# Patient Record
Sex: FEMALE | Race: OTHER | Employment: UNEMPLOYED | URBAN - METROPOLITAN AREA
[De-identification: names, ages, dates, MRNs, and addresses within clinical notes are randomized per-mention and may not be internally consistent; named-entity substitution may affect disease eponyms.]

---

## 2020-04-24 ENCOUNTER — HOSPITAL ENCOUNTER (OUTPATIENT)
Age: 30
Discharge: HOME | End: 2020-04-24
Attending: OBSTETRICS & GYNECOLOGY | Admitting: OBSTETRICS & GYNECOLOGY

## 2020-04-24 ENCOUNTER — HOSPITAL ENCOUNTER (EMERGENCY)
Age: 30
Discharge: HOME | End: 2020-04-24

## 2020-04-24 ENCOUNTER — APPOINTMENT (OUTPATIENT)
Dept: ULTRASOUND IMAGING | Age: 30
End: 2020-04-24

## 2020-04-24 VITALS
TEMPERATURE: 98.1 F | HEIGHT: 62 IN | SYSTOLIC BLOOD PRESSURE: 93 MMHG | OXYGEN SATURATION: 99 % | HEART RATE: 98 BPM | RESPIRATION RATE: 18 BRPM | DIASTOLIC BLOOD PRESSURE: 56 MMHG | WEIGHT: 150 LBS | BODY MASS INDEX: 27.6 KG/M2

## 2020-04-24 VITALS — BODY MASS INDEX: 23.36 KG/M2 | HEIGHT: 60 IN | RESPIRATION RATE: 16 BRPM | WEIGHT: 119 LBS

## 2020-04-24 LAB
A/G RATIO: 1 (ref 1.1–2.2)
ABO/RH: NORMAL
ALBUMIN SERPL-MCNC: 3.5 G/DL (ref 3.4–5)
ALP BLD-CCNC: 121 U/L (ref 40–129)
ALT SERPL-CCNC: 10 U/L (ref 10–40)
ANION GAP SERPL CALCULATED.3IONS-SCNC: 14 MMOL/L (ref 3–16)
AST SERPL-CCNC: 20 U/L (ref 15–37)
BACTERIA WET PREP: NORMAL
BACTERIA: ABNORMAL /HPF
BASOPHILS ABSOLUTE: 0 K/UL (ref 0–0.2)
BASOPHILS RELATIVE PERCENT: 0.3 %
BILIRUB SERPL-MCNC: 0.4 MG/DL (ref 0–1)
BILIRUBIN URINE: ABNORMAL
BLOOD, URINE: NEGATIVE
BUN BLDV-MCNC: 3 MG/DL (ref 7–20)
CALCIUM SERPL-MCNC: 8.7 MG/DL (ref 8.3–10.6)
CHLORIDE BLD-SCNC: 101 MMOL/L (ref 99–110)
CLARITY: CLEAR
CLUE CELLS: NORMAL
CO2: 21 MMOL/L (ref 21–32)
COLOR: ABNORMAL
CREAT SERPL-MCNC: <0.5 MG/DL (ref 0.6–1.1)
EOSINOPHILS ABSOLUTE: 0 K/UL (ref 0–0.6)
EOSINOPHILS RELATIVE PERCENT: 0.1 %
EPITHELIAL CELLS WET PREP: NORMAL
EPITHELIAL CELLS, UA: 5 /HPF (ref 0–5)
GFR AFRICAN AMERICAN: >60
GFR NON-AFRICAN AMERICAN: >60
GLOBULIN: 3.6 G/DL
GLUCOSE BLD-MCNC: 119 MG/DL (ref 70–99)
GLUCOSE URINE: NEGATIVE MG/DL
GONADOTROPIN, CHORIONIC (HCG) QUANT: NORMAL MIU/ML
HCT VFR BLD CALC: 34.1 % (ref 36–48)
HEMOGLOBIN: 11.4 G/DL (ref 12–16)
HYALINE CASTS: 5 /LPF (ref 0–8)
KETONES, URINE: >=80 MG/DL
LEUKOCYTE ESTERASE, URINE: ABNORMAL
LIPASE: 17 U/L (ref 13–60)
LYMPHOCYTES ABSOLUTE: 0.8 K/UL (ref 1–5.1)
LYMPHOCYTES RELATIVE PERCENT: 15.2 %
MCH RBC QN AUTO: 30.1 PG (ref 26–34)
MCHC RBC AUTO-ENTMCNC: 33.6 G/DL (ref 31–36)
MCV RBC AUTO: 89.5 FL (ref 80–100)
MICROSCOPIC EXAMINATION: YES
MONOCYTES ABSOLUTE: 0.3 K/UL (ref 0–1.3)
MONOCYTES RELATIVE PERCENT: 5.7 %
NEUTROPHILS ABSOLUTE: 4.1 K/UL (ref 1.7–7.7)
NEUTROPHILS RELATIVE PERCENT: 78.7 %
NITRITE, URINE: NEGATIVE
PDW BLD-RTO: 12.8 % (ref 12.4–15.4)
PH UA: 6 (ref 5–8)
PLATELET # BLD: 247 K/UL (ref 135–450)
PMV BLD AUTO: 7.5 FL (ref 5–10.5)
POTASSIUM SERPL-SCNC: 4.1 MMOL/L (ref 3.5–5.1)
PROTEIN UA: NEGATIVE MG/DL
RBC # BLD: 3.81 M/UL (ref 4–5.2)
RBC UA: 2 /HPF (ref 0–4)
RBC WET PREP: NORMAL
SODIUM BLD-SCNC: 136 MMOL/L (ref 136–145)
SOURCE WET PREP: NORMAL
SPECIFIC GRAVITY UA: 1.02 (ref 1–1.03)
TOTAL PROTEIN: 7.1 G/DL (ref 6.4–8.2)
TRICHOMONAS PREP: NORMAL
URINE REFLEX TO CULTURE: YES
URINE TYPE: ABNORMAL
UROBILINOGEN, URINE: 1 E.U./DL
WBC # BLD: 5.2 K/UL (ref 4–11)
WBC UA: 6 /HPF (ref 0–5)
WBC WET PREP: NORMAL
YEAST WET PREP: NORMAL

## 2020-04-24 PROCEDURE — 87086 URINE CULTURE/COLONY COUNT: CPT

## 2020-04-24 PROCEDURE — 99211 OFF/OP EST MAY X REQ PHY/QHP: CPT

## 2020-04-24 PROCEDURE — 76815 OB US LIMITED FETUS(S): CPT

## 2020-04-24 PROCEDURE — 87491 CHLMYD TRACH DNA AMP PROBE: CPT

## 2020-04-24 PROCEDURE — 83690 ASSAY OF LIPASE: CPT

## 2020-04-24 PROCEDURE — 6370000000 HC RX 637 (ALT 250 FOR IP): Performed by: PHYSICIAN ASSISTANT

## 2020-04-24 PROCEDURE — 36415 COLL VENOUS BLD VENIPUNCTURE: CPT

## 2020-04-24 PROCEDURE — 85025 COMPLETE CBC W/AUTO DIFF WBC: CPT

## 2020-04-24 PROCEDURE — 86901 BLOOD TYPING SEROLOGIC RH(D): CPT

## 2020-04-24 PROCEDURE — 81001 URINALYSIS AUTO W/SCOPE: CPT

## 2020-04-24 PROCEDURE — 99284 EMERGENCY DEPT VISIT MOD MDM: CPT

## 2020-04-24 PROCEDURE — 87210 SMEAR WET MOUNT SALINE/INK: CPT

## 2020-04-24 PROCEDURE — 84702 CHORIONIC GONADOTROPIN TEST: CPT

## 2020-04-24 PROCEDURE — 86900 BLOOD TYPING SEROLOGIC ABO: CPT

## 2020-04-24 PROCEDURE — 87591 N.GONORRHOEAE DNA AMP PROB: CPT

## 2020-04-24 PROCEDURE — 80053 COMPREHEN METABOLIC PANEL: CPT

## 2020-04-24 RX ORDER — PNV NO.95/FERROUS FUM/FOLIC AC 28MG-0.8MG
1 TABLET ORAL DAILY
Qty: 30 TABLET | Refills: 0 | Status: ON HOLD | OUTPATIENT
Start: 2020-04-24 | End: 2020-06-28

## 2020-04-24 RX ORDER — CEPHALEXIN 500 MG/1
500 CAPSULE ORAL 2 TIMES DAILY
Qty: 10 CAPSULE | Refills: 0 | Status: ON HOLD | OUTPATIENT
Start: 2020-04-24 | End: 2020-06-30 | Stop reason: HOSPADM

## 2020-04-24 RX ORDER — ACETAMINOPHEN 500 MG
1000 TABLET ORAL ONCE
Status: COMPLETED | OUTPATIENT
Start: 2020-04-24 | End: 2020-04-24

## 2020-04-24 RX ADMIN — ACETAMINOPHEN 1000 MG: 500 TABLET, FILM COATED ORAL at 14:44

## 2020-04-24 ASSESSMENT — PAIN SCALES - GENERAL
PAINLEVEL_OUTOF10: 10
PAINLEVEL_OUTOF10: 9

## 2020-04-24 ASSESSMENT — PAIN DESCRIPTION - LOCATION: LOCATION: ABDOMEN

## 2020-04-24 ASSESSMENT — PAIN DESCRIPTION - PAIN TYPE: TYPE: ACUTE PAIN

## 2020-04-24 NOTE — ED PROVIDER NOTES
History reviewed. No pertinent past medical history. SURGICAL HISTORY   History reviewed. No pertinent surgical history. [address]       Discharge Medication List as of 4/24/2020  4:18 PM            ALLERGIES     Patient has no known allergies. FAMILYHISTORY     History reviewed. No pertinent family history. SOCIAL HISTORY       Social History     Tobacco Use    Smoking status: Never Smoker    Smokeless tobacco: Never Used   Substance Use Topics    Alcohol use: Not on file    Drug use: Not on file       SCREENINGS             PHYSICAL EXAM    (up to 7 for level 4, 8 or more for level 5)     ED Triage Vitals [04/24/20 1230]   BP Temp Temp Source Pulse Resp SpO2 Height Weight   99/65 98.1 °F (36.7 °C) Oral 115 16 97 % 5' 2\" (1.575 m) 150 lb (68 kg)       Physical Exam  Vitals signs and nursing note reviewed. Constitutional:       Appearance: She is well-developed. She is not diaphoretic. HENT:      Head: Atraumatic. Nose: Nose normal.   Eyes:      General:         Right eye: No discharge. Left eye: No discharge. Neck:      Musculoskeletal: Normal range of motion. Cardiovascular:      Rate and Rhythm: Normal rate and regular rhythm. Heart sounds: No murmur. No friction rub. No gallop. Pulmonary:      Effort: Pulmonary effort is normal. No respiratory distress. Breath sounds: No stridor. No wheezing, rhonchi or rales. Abdominal:      General: Bowel sounds are normal. There is no distension. Palpations: Abdomen is soft. There is no mass. Tenderness: There is no abdominal tenderness. There is no guarding or rebound. Hernia: No hernia is present. Musculoskeletal: Normal range of motion. General: No swelling. Skin:     General: Skin is warm and dry. Findings: No erythema or rash. Neurological:      Mental Status: She is alert and oriented to person, place, and time. Cranial Nerves: No cranial nerve deficit. Psychiatric:         Behavior: Behavior normal.         DIAGNOSTIC RESULTS   LABS:    Labs Reviewed   CBC WITH AUTO DIFFERENTIAL - Abnormal; Notable for the following components:       Result Value    RBC 3.81 (*)     Hemoglobin 11.4 (*)     Hematocrit 34.1 (*)     Lymphocytes Absolute 0.8 (*)     All other components within normal limits    Narrative:     Performed at:  [facility] [address]  [place]   Phone [phone]   URINE RT REFLEX TO CULTURE - Abnormal; Notable for the following components:    Bilirubin Urine SMALL (*)     Ketones, Urine >=80 (*)     Leukocyte Esterase, Urine SMALL (*)     All other components within normal limits    Narrative:     Performed at:  [facility] [address]  [place]   Phone [phone]   COMPREHENSIVE METABOLIC PANEL - Abnormal; Notable for the following components:    Glucose 119 (*)     BUN 3 (*)     CREATININE <0.5 (*)     Albumin/Globulin Ratio 1.0 (*)     All other components within normal limits    Narrative:     Performed at:  [facility] [address]  [place]   Phone [phone]   MICROSCOPIC URINALYSIS - Abnormal; Notable for the following components:    Bacteria, UA RARE (*)     WBC, UA 6 (*)     All other components within normal limits    Narrative:     Performed at:  [facility] [address]  [place]   Phone [phone]   WET PREP, GENITAL    Narrative:     Performed at:  [facility] [address],  [place]   Phone [phone]   CULTURE, URINE   C.TRACHOMATIS N.GONORRHOEAE DNA   HCG, QUANTITATIVE, PREGNANCY    Narrative:     Performed at:  [facility] [address]  [place]   Phone [phone]   LIPASE    Narrative:     Performed at:  [facility]) -

## 2020-04-24 NOTE — ED NOTES
Reviewed discharge instructions with patient and patient significant other. No questions at this time. No sign of distress. AOx3. Pt taken to [facility] triage via wheelchair by this RN. 20g PIV, placed by ER RN, intact and patent at time of transport.       Sheldon Clement RN  04/24/20 [address] Perales RN  04/24/20 1956

## 2020-04-24 NOTE — FLOWSHEET NOTE
Reviewed discharge instructions with patient and patient verbalized understanding. Discharged home with partner, ambulatory and undelivered.

## 2020-04-25 LAB — URINE CULTURE, ROUTINE: NORMAL

## 2020-04-27 LAB
C TRACH DNA GENITAL QL NAA+PROBE: NEGATIVE
N. GONORRHOEAE DNA: NEGATIVE

## 2020-05-12 ENCOUNTER — INITIAL PRENATAL (OUTPATIENT)
Dept: OBGYN | Age: 30
End: 2020-05-12

## 2020-05-12 VITALS
HEART RATE: 71 BPM | BODY MASS INDEX: 23.63 KG/M2 | WEIGHT: 121 LBS | SYSTOLIC BLOOD PRESSURE: 95 MMHG | DIASTOLIC BLOOD PRESSURE: 51 MMHG

## 2020-05-12 LAB
ABO/RH: NORMAL
ANTIBODY SCREEN: NORMAL
BILIRUBIN URINE: NEGATIVE MG/DL
BLOOD, URINE: NEGATIVE
CLARITY: CLEAR
COLOR: YELLOW
GLUCOSE CHALLENGE: 117 MG/DL
GLUCOSE URINE: NEGATIVE MG/DL
HEPATITIS C ANTIBODY INTERPRETATION: NORMAL
KETONES, URINE: NEGATIVE MG/DL
LEUKOCYTE ESTERASE, URINE: NEGATIVE
NITRITE, URINE: NEGATIVE
PH UA: 7 (ref 5–8)
PROTEIN UA: NEGATIVE MG/DL
SPECIFIC GRAVITY UA: 1.01 (ref 1–1.03)
UROBILINOGEN, URINE: 0.2 E.U./DL

## 2020-05-12 PROCEDURE — 86702 HIV-2 ANTIBODY: CPT

## 2020-05-12 PROCEDURE — 87340 HEPATITIS B SURFACE AG IA: CPT

## 2020-05-12 PROCEDURE — 88175 CYTOPATH C/V AUTO FLUID REDO: CPT

## 2020-05-12 PROCEDURE — 87390 HIV-1 AG IA: CPT

## 2020-05-12 PROCEDURE — 86780 TREPONEMA PALLIDUM: CPT

## 2020-05-12 PROCEDURE — 85025 COMPLETE CBC W/AUTO DIFF WBC: CPT

## 2020-05-12 PROCEDURE — 86701 HIV-1ANTIBODY: CPT

## 2020-05-12 PROCEDURE — 86900 BLOOD TYPING SEROLOGIC ABO: CPT

## 2020-05-12 PROCEDURE — 82950 GLUCOSE TEST: CPT

## 2020-05-12 PROCEDURE — 87086 URINE CULTURE/COLONY COUNT: CPT

## 2020-05-12 PROCEDURE — 86901 BLOOD TYPING SEROLOGIC RH(D): CPT

## 2020-05-12 PROCEDURE — 86850 RBC ANTIBODY SCREEN: CPT

## 2020-05-12 PROCEDURE — 86592 SYPHILIS TEST NON-TREP QUAL: CPT

## 2020-05-12 PROCEDURE — 99204 OFFICE O/P NEW MOD 45 MIN: CPT | Performed by: OBSTETRICS & GYNECOLOGY

## 2020-05-12 PROCEDURE — 86762 RUBELLA ANTIBODY: CPT

## 2020-05-12 PROCEDURE — 81003 URINALYSIS AUTO W/O SCOPE: CPT

## 2020-05-12 PROCEDURE — 86803 HEPATITIS C AB TEST: CPT

## 2020-05-12 RX ORDER — VITAMIN A, VITAMIN C, VITAMIN D-3, VITAMIN E, VITAMIN B-1, VITAMIN B-2, NIACIN, VITAMIN B-6, CALCIUM, IRON, ZINC, COPPER 4000; 120; 400; 22; 1.84; 3; 20; 10; 1; 12; 200; 27; 25; 2 [IU]/1; MG/1; [IU]/1; MG/1; MG/1; MG/1; MG/1; MG/1; MG/1; UG/1; MG/1; MG/1; MG/1; MG/1
1 TABLET ORAL DAILY
Qty: 30 TABLET | Refills: 11 | Status: ON HOLD | OUTPATIENT
Start: 2020-05-12 | End: 2020-06-30 | Stop reason: HOSPADM

## 2020-05-12 NOTE — PROGRESS NOTES
Initial prenatal visit late to care, states she was seen in ER/triage 4/24/20 US report on chart. Denies discomfort , contractions bleeding or leaking. + fetal movement kick counts education given verbalized understanding .  No cats in home states she had chickenpox as child, no falls and no travel outside of [place] in past 3 months, no exposure to anyone with covid -19

## 2020-05-13 LAB
BASOPHILS ABSOLUTE: 0 K/UL (ref 0–0.2)
BASOPHILS RELATIVE PERCENT: 0.6 %
EOSINOPHILS ABSOLUTE: 0.1 K/UL (ref 0–0.6)
EOSINOPHILS RELATIVE PERCENT: 1.7 %
HCT VFR BLD CALC: 32.1 % (ref 36–48)
HEMOGLOBIN: 10.6 G/DL (ref 12–16)
HEPATITIS B SURFACE ANTIGEN INTERPRETATION: ABNORMAL
HIV AG/AB: NORMAL
HIV ANTIGEN: NORMAL
HIV-1 ANTIBODY: NORMAL
HIV-2 AB: NORMAL
LYMPHOCYTES ABSOLUTE: 0.9 K/UL (ref 1–5.1)
LYMPHOCYTES RELATIVE PERCENT: 18.4 %
MCH RBC QN AUTO: 29.6 PG (ref 26–34)
MCHC RBC AUTO-ENTMCNC: 33.1 G/DL (ref 31–36)
MCV RBC AUTO: 89.3 FL (ref 80–100)
MONOCYTES ABSOLUTE: 0.4 K/UL (ref 0–1.3)
MONOCYTES RELATIVE PERCENT: 8.6 %
NEUTROPHILS ABSOLUTE: 3.6 K/UL (ref 1.7–7.7)
NEUTROPHILS RELATIVE PERCENT: 70.7 %
PDW BLD-RTO: 13.4 % (ref 12.4–15.4)
PLATELET # BLD: 313 K/UL (ref 135–450)
PMV BLD AUTO: 8.2 FL (ref 5–10.5)
RBC # BLD: 3.6 M/UL (ref 4–5.2)
RUBELLA ANTIBODY IGG: 89 IU/ML
TOTAL SYPHILLIS IGG/IGM: ABNORMAL
URINE CULTURE, ROUTINE: NORMAL
WBC # BLD: 5 K/UL (ref 4–11)

## 2020-05-22 ENCOUNTER — HOSPITAL ENCOUNTER (OUTPATIENT)
Dept: ULTRASOUND IMAGING | Age: 30
Discharge: HOME | End: 2020-05-22

## 2020-05-22 PROCEDURE — 76805 OB US >/= 14 WKS SNGL FETUS: CPT

## 2020-05-24 PROBLEM — Z78.9 LANGUAGE BARRIER: Status: ACTIVE | Noted: 2020-05-24

## 2020-05-24 PROBLEM — O09.33 LATE PRENATAL CARE AFFECTING PREGNANCY IN THIRD TRIMESTER: Status: ACTIVE | Noted: 2020-05-24

## 2020-05-26 ENCOUNTER — ROUTINE PRENATAL (OUTPATIENT)
Dept: OBGYN | Age: 30
End: 2020-05-26

## 2020-05-26 VITALS
BODY MASS INDEX: 24.22 KG/M2 | WEIGHT: 124 LBS | DIASTOLIC BLOOD PRESSURE: 59 MMHG | SYSTOLIC BLOOD PRESSURE: 93 MMHG | HEART RATE: 80 BPM

## 2020-05-26 LAB
BILIRUBIN URINE: NEGATIVE MG/DL
BLOOD, URINE: NEGATIVE
CLARITY: CLEAR
COLOR: YELLOW
GLUCOSE URINE: NEGATIVE MG/DL
KETONES, URINE: NEGATIVE MG/DL
LEUKOCYTE ESTERASE, URINE: ABNORMAL
NITRITE, URINE: NEGATIVE
PH UA: 7.5 (ref 5–8)
PROTEIN UA: 100 MG/DL
SPECIFIC GRAVITY UA: 1.01 (ref 1–1.03)
UROBILINOGEN, URINE: 0.2 E.U./DL

## 2020-05-26 PROCEDURE — 90471 IMMUNIZATION ADMIN: CPT | Performed by: OBSTETRICS & GYNECOLOGY

## 2020-05-26 PROCEDURE — 6360000002 HC RX W HCPCS: Performed by: OBSTETRICS & GYNECOLOGY

## 2020-05-26 PROCEDURE — 99212 OFFICE O/P EST SF 10 MIN: CPT | Performed by: OBSTETRICS & GYNECOLOGY

## 2020-05-26 PROCEDURE — 90715 TDAP VACCINE 7 YRS/> IM: CPT | Performed by: OBSTETRICS & GYNECOLOGY

## 2020-05-26 PROCEDURE — 81003 URINALYSIS AUTO W/O SCOPE: CPT

## 2020-05-26 RX ADMIN — TETANUS TOXOID, REDUCED DIPHTHERIA TOXOID AND ACELLULAR PERTUSSIS VACCINE, ADSORBED 0.5 ML: 5; 2.5; 8; 8; 2.5 SUSPENSION INTRAMUSCULAR at 15:14

## 2020-05-26 NOTE — PROGRESS NOTES
No complaints. Taking PNV> Labs reviewed and WNL. Received Tdap. 2+ prot in urine. No edema .  Contaminant

## 2020-06-16 ENCOUNTER — ROUTINE PRENATAL (OUTPATIENT)
Dept: OBGYN | Age: 30
End: 2020-06-16

## 2020-06-16 ENCOUNTER — HOSPITAL ENCOUNTER (OUTPATIENT)
Age: 30
Discharge: HOME | End: 2020-06-16

## 2020-06-16 VITALS
HEART RATE: 75 BPM | SYSTOLIC BLOOD PRESSURE: 93 MMHG | WEIGHT: 129 LBS | BODY MASS INDEX: 25.19 KG/M2 | DIASTOLIC BLOOD PRESSURE: 61 MMHG

## 2020-06-16 LAB
BILIRUBIN URINE: NEGATIVE MG/DL
BLOOD, URINE: NEGATIVE
CLARITY: CLEAR
COLOR: YELLOW
GLUCOSE URINE: NEGATIVE MG/DL
HCT VFR BLD CALC: 35.6 % (ref 36–48)
HEMOGLOBIN: 11.6 G/DL (ref 12–16)
KETONES, URINE: NEGATIVE MG/DL
LEUKOCYTE ESTERASE, URINE: NEGATIVE
MCH RBC QN AUTO: 29 PG (ref 26–34)
MCHC RBC AUTO-ENTMCNC: 32.6 G/DL (ref 31–36)
MCV RBC AUTO: 88.9 FL (ref 80–100)
NITRITE, URINE: NEGATIVE
PDW BLD-RTO: 15.2 % (ref 12.4–15.4)
PH UA: 7 (ref 5–8)
PLATELET # BLD: 266 K/UL (ref 135–450)
PMV BLD AUTO: 8.7 FL (ref 5–10.5)
PROTEIN UA: NEGATIVE MG/DL
RBC # BLD: 4.01 M/UL (ref 4–5.2)
SPECIFIC GRAVITY UA: 1.01 (ref 1–1.03)
UROBILINOGEN, URINE: 0.2 E.U./DL
WBC # BLD: 5.8 K/UL (ref 4–11)

## 2020-06-16 PROCEDURE — 81003 URINALYSIS AUTO W/O SCOPE: CPT

## 2020-06-16 PROCEDURE — 99212 OFFICE O/P EST SF 10 MIN: CPT | Performed by: OBSTETRICS & GYNECOLOGY

## 2020-06-16 PROCEDURE — 87081 CULTURE SCREEN ONLY: CPT

## 2020-06-16 PROCEDURE — 36415 COLL VENOUS BLD VENIPUNCTURE: CPT

## 2020-06-16 PROCEDURE — 85027 COMPLETE CBC AUTOMATED: CPT

## 2020-06-16 NOTE — PROGRESS NOTES
Patient denies vaginal bleeding and leaking of fluid. Patient states she has occas ctx. GBS information sheet given and explained. Pt states understanding of testing and denies further questions.

## 2020-06-20 LAB — GROUP B STREP CULTURE: NORMAL

## 2020-06-23 ENCOUNTER — ROUTINE PRENATAL (OUTPATIENT)
Dept: OBGYN | Age: 30
End: 2020-06-23

## 2020-06-23 LAB
BILIRUBIN URINE: NEGATIVE MG/DL
BLOOD, URINE: NEGATIVE
CLARITY: CLEAR
COLOR: YELLOW
GLUCOSE URINE: NEGATIVE MG/DL
KETONES, URINE: NEGATIVE MG/DL
LEUKOCYTE ESTERASE, URINE: ABNORMAL
NITRITE, URINE: NEGATIVE
PH UA: 7.5 (ref 5–8)
PROTEIN UA: ABNORMAL MG/DL
SPECIFIC GRAVITY UA: 1.02 (ref 1–1.03)
UROBILINOGEN, URINE: 0.2 E.U./DL

## 2020-06-23 PROCEDURE — 99212 OFFICE O/P EST SF 10 MIN: CPT | Performed by: OBSTETRICS & GYNECOLOGY

## 2020-06-23 PROCEDURE — 81003 URINALYSIS AUTO W/O SCOPE: CPT

## 2020-06-28 ENCOUNTER — HOSPITAL ENCOUNTER (INPATIENT)
Age: 30
LOS: 2 days | Discharge: HOME | DRG: 560 | End: 2020-06-30
Attending: OBSTETRICS & GYNECOLOGY | Admitting: OBSTETRICS & GYNECOLOGY
Payer: MEDICAID

## 2020-06-28 ENCOUNTER — ANESTHESIA EVENT (OUTPATIENT)
Dept: LABOR AND DELIVERY | Age: 30
End: 2020-06-28

## 2020-06-28 ENCOUNTER — ANESTHESIA (OUTPATIENT)
Dept: LABOR AND DELIVERY | Age: 30
End: 2020-06-28

## 2020-06-28 PROBLEM — Z37.9 NORMAL LABOR: Status: ACTIVE | Noted: 2020-06-28

## 2020-06-28 LAB
ABO/RH: NORMAL
AMPHETAMINE SCREEN, URINE: NORMAL
ANTIBODY SCREEN: NORMAL
BARBITURATE SCREEN URINE: NORMAL
BASOPHILS ABSOLUTE: 0 K/UL (ref 0–0.2)
BASOPHILS RELATIVE PERCENT: 0.4 %
BENZODIAZEPINE SCREEN, URINE: NORMAL
BUPRENORPHINE URINE: NORMAL
CANNABINOID SCREEN URINE: NORMAL
COCAINE METABOLITE SCREEN URINE: NORMAL
EOSINOPHILS ABSOLUTE: 0 K/UL (ref 0–0.6)
EOSINOPHILS RELATIVE PERCENT: 0.4 %
HCT VFR BLD CALC: 35.7 % (ref 36–48)
HEMOGLOBIN: 12 G/DL (ref 12–16)
LYMPHOCYTES ABSOLUTE: 1.3 K/UL (ref 1–5.1)
LYMPHOCYTES RELATIVE PERCENT: 14.6 %
Lab: NORMAL
MCH RBC QN AUTO: 29.4 PG (ref 26–34)
MCHC RBC AUTO-ENTMCNC: 33.7 G/DL (ref 31–36)
MCV RBC AUTO: 87 FL (ref 80–100)
METHADONE SCREEN, URINE: NORMAL
MONOCYTES ABSOLUTE: 0.5 K/UL (ref 0–1.3)
MONOCYTES RELATIVE PERCENT: 6.3 %
NEUTROPHILS ABSOLUTE: 6.8 K/UL (ref 1.7–7.7)
NEUTROPHILS RELATIVE PERCENT: 78.3 %
OPIATE SCREEN URINE: NORMAL
OXYCODONE URINE: NORMAL
PDW BLD-RTO: 15.2 % (ref 12.4–15.4)
PH UA: 6
PHENCYCLIDINE SCREEN URINE: NORMAL
PLATELET # BLD: 281 K/UL (ref 135–450)
PMV BLD AUTO: 8.2 FL (ref 5–10.5)
PROPOXYPHENE SCREEN: NORMAL
RBC # BLD: 4.1 M/UL (ref 4–5.2)
SARS-COV-2, NAAT: NOT DETECTED
WBC # BLD: 8.7 K/UL (ref 4–11)

## 2020-06-28 PROCEDURE — U0002 COVID-19 LAB TEST NON-CDC: HCPCS

## 2020-06-28 PROCEDURE — 86901 BLOOD TYPING SEROLOGIC RH(D): CPT

## 2020-06-28 PROCEDURE — 2580000003 HC RX 258: Performed by: OBSTETRICS & GYNECOLOGY

## 2020-06-28 PROCEDURE — 86850 RBC ANTIBODY SCREEN: CPT

## 2020-06-28 PROCEDURE — 80307 DRUG TEST PRSMV CHEM ANLYZR: CPT

## 2020-06-28 PROCEDURE — 85025 COMPLETE CBC W/AUTO DIFF WBC: CPT

## 2020-06-28 PROCEDURE — 0HQ9XZZ REPAIR PERINEUM SKIN, EXTERNAL APPROACH: ICD-10-PCS | Performed by: OBSTETRICS & GYNECOLOGY

## 2020-06-28 PROCEDURE — 6370000000 HC RX 637 (ALT 250 FOR IP): Performed by: OBSTETRICS & GYNECOLOGY

## 2020-06-28 PROCEDURE — 6360000002 HC RX W HCPCS

## 2020-06-28 PROCEDURE — 86900 BLOOD TYPING SEROLOGIC ABO: CPT

## 2020-06-28 PROCEDURE — 2500000003 HC RX 250 WO HCPCS

## 2020-06-28 PROCEDURE — 86780 TREPONEMA PALLIDUM: CPT

## 2020-06-28 PROCEDURE — 1220000000 HC SEMI PRIVATE OB R&B

## 2020-06-28 PROCEDURE — 7200000001 HC VAGINAL DELIVERY

## 2020-06-28 PROCEDURE — 6360000002 HC RX W HCPCS: Performed by: OBSTETRICS & GYNECOLOGY

## 2020-06-28 RX ORDER — ONDANSETRON 2 MG/ML
4 INJECTION INTRAMUSCULAR; INTRAVENOUS EVERY 6 HOURS PRN
Status: DISCONTINUED | OUTPATIENT
Start: 2020-06-28 | End: 2020-06-28

## 2020-06-28 RX ORDER — DOCUSATE SODIUM 100 MG/1
100 CAPSULE, LIQUID FILLED ORAL 2 TIMES DAILY
Status: DISCONTINUED | OUTPATIENT
Start: 2020-06-28 | End: 2020-06-30 | Stop reason: HOSPADM

## 2020-06-28 RX ORDER — CARBOPROST TROMETHAMINE 250 UG/ML
250 INJECTION, SOLUTION INTRAMUSCULAR PRN
Status: DISCONTINUED | OUTPATIENT
Start: 2020-06-28 | End: 2020-06-28

## 2020-06-28 RX ORDER — HYDROCODONE BITARTRATE AND ACETAMINOPHEN 5; 325 MG/1; MG/1
2 TABLET ORAL EVERY 4 HOURS PRN
Status: DISCONTINUED | OUTPATIENT
Start: 2020-06-28 | End: 2020-06-30 | Stop reason: HOSPADM

## 2020-06-28 RX ORDER — ACETAMINOPHEN 325 MG/1
650 TABLET ORAL EVERY 4 HOURS PRN
Status: DISCONTINUED | OUTPATIENT
Start: 2020-06-28 | End: 2020-06-28

## 2020-06-28 RX ORDER — SODIUM CHLORIDE 0.9 % (FLUSH) 0.9 %
10 SYRINGE (ML) INJECTION EVERY 12 HOURS SCHEDULED
Status: DISCONTINUED | OUTPATIENT
Start: 2020-06-28 | End: 2020-06-28

## 2020-06-28 RX ORDER — SODIUM CHLORIDE, SODIUM LACTATE, POTASSIUM CHLORIDE, CALCIUM CHLORIDE 600; 310; 30; 20 MG/100ML; MG/100ML; MG/100ML; MG/100ML
INJECTION, SOLUTION INTRAVENOUS CONTINUOUS
Status: DISCONTINUED | OUTPATIENT
Start: 2020-06-28 | End: 2020-06-28

## 2020-06-28 RX ORDER — SODIUM CHLORIDE 0.9 % (FLUSH) 0.9 %
10 SYRINGE (ML) INJECTION PRN
Status: DISCONTINUED | OUTPATIENT
Start: 2020-06-28 | End: 2020-06-28

## 2020-06-28 RX ORDER — HYDROCODONE BITARTRATE AND ACETAMINOPHEN 5; 325 MG/1; MG/1
1 TABLET ORAL EVERY 4 HOURS PRN
Status: DISCONTINUED | OUTPATIENT
Start: 2020-06-28 | End: 2020-06-30 | Stop reason: HOSPADM

## 2020-06-28 RX ORDER — LIDOCAINE HYDROCHLORIDE 10 MG/ML
INJECTION, SOLUTION EPIDURAL; INFILTRATION; INTRACAUDAL; PERINEURAL
Status: DISCONTINUED
Start: 2020-06-28 | End: 2020-06-28

## 2020-06-28 RX ORDER — MODIFIED LANOLIN
OINTMENT (GRAM) TOPICAL PRN
Status: DISCONTINUED | OUTPATIENT
Start: 2020-06-28 | End: 2020-06-30 | Stop reason: HOSPADM

## 2020-06-28 RX ORDER — SODIUM CHLORIDE 0.9 % (FLUSH) 0.9 %
10 SYRINGE (ML) INJECTION EVERY 12 HOURS SCHEDULED
Status: DISCONTINUED | OUTPATIENT
Start: 2020-06-28 | End: 2020-06-30 | Stop reason: HOSPADM

## 2020-06-28 RX ORDER — IBUPROFEN 800 MG/1
800 TABLET ORAL EVERY 8 HOURS
Status: DISCONTINUED | OUTPATIENT
Start: 2020-06-28 | End: 2020-06-30 | Stop reason: HOSPADM

## 2020-06-28 RX ORDER — OXYCODONE HYDROCHLORIDE 5 MG/1
5 TABLET ORAL EVERY 6 HOURS PRN
Qty: 10 TABLET | Refills: 0 | Status: SHIPPED | OUTPATIENT
Start: 2020-06-28 | End: 2020-07-05

## 2020-06-28 RX ORDER — METHYLERGONOVINE MALEATE 0.2 MG/ML
200 INJECTION INTRAVENOUS PRN
Status: DISCONTINUED | OUTPATIENT
Start: 2020-06-28 | End: 2020-06-28

## 2020-06-28 RX ORDER — ACETAMINOPHEN 325 MG/1
650 TABLET ORAL EVERY 4 HOURS PRN
Status: DISCONTINUED | OUTPATIENT
Start: 2020-06-28 | End: 2020-06-30 | Stop reason: HOSPADM

## 2020-06-28 RX ORDER — SODIUM CHLORIDE 0.9 % (FLUSH) 0.9 %
10 SYRINGE (ML) INJECTION PRN
Status: DISCONTINUED | OUTPATIENT
Start: 2020-06-28 | End: 2020-06-30 | Stop reason: HOSPADM

## 2020-06-28 RX ORDER — DOCUSATE SODIUM 100 MG/1
100 CAPSULE, LIQUID FILLED ORAL 2 TIMES DAILY
Status: DISCONTINUED | OUTPATIENT
Start: 2020-06-28 | End: 2020-06-28

## 2020-06-28 RX ORDER — IBUPROFEN 600 MG/1
600 TABLET ORAL EVERY 6 HOURS PRN
Qty: 30 TABLET | Refills: 1 | Status: SHIPPED | OUTPATIENT
Start: 2020-06-28

## 2020-06-28 RX ADMIN — SODIUM CHLORIDE, POTASSIUM CHLORIDE, SODIUM LACTATE AND CALCIUM CHLORIDE: 600; 310; 30; 20 INJECTION, SOLUTION INTRAVENOUS at 16:35

## 2020-06-28 RX ADMIN — DOCUSATE SODIUM 100 MG: 100 CAPSULE ORAL at 20:40

## 2020-06-28 RX ADMIN — IBUPROFEN 800 MG: 800 TABLET, FILM COATED ORAL at 20:40

## 2020-06-28 RX ADMIN — SODIUM CHLORIDE, POTASSIUM CHLORIDE, SODIUM LACTATE AND CALCIUM CHLORIDE: 600; 310; 30; 20 INJECTION, SOLUTION INTRAVENOUS at 12:00

## 2020-06-28 RX ADMIN — SODIUM CHLORIDE, POTASSIUM CHLORIDE, SODIUM LACTATE AND CALCIUM CHLORIDE: 600; 310; 30; 20 INJECTION, SOLUTION INTRAVENOUS at 11:20

## 2020-06-28 RX ADMIN — METHYLERGONOVINE MALEATE 200 MCG: 0.2 INJECTION INTRAVENOUS at 17:44

## 2020-06-28 RX ADMIN — Medication: at 16:35

## 2020-06-28 RX ADMIN — LIDOCAINE HYDROCHLORIDE: 10 INJECTION, SOLUTION EPIDURAL; INFILTRATION; INTRACAUDAL; PERINEURAL at 16:45

## 2020-06-28 ASSESSMENT — PAIN SCALES - GENERAL: PAINLEVEL_OUTOF10: 2

## 2020-06-28 NOTE — PLAN OF CARE
Patient transferred to postpartum bed 4. Patient bleeding decreased post st cath at [address] for 600cc. Patient informed to call out to RN to ambulate to bathroom.

## 2020-06-28 NOTE — PLAN OF CARE
Problem: Pain:  Goal: Pain level will decrease  Description: Pain level will decrease  Outcome: Pain is increasing related to contractions. Denies medication, denies epidural, denies NCB interventions, prefers to stay in bed and ult. Focus breathing techniques.

## 2020-06-28 NOTE — DISCHARGE SUMMARY
Obstetrical Discharge Form    Gestational Age: 36w4d    Antepartum complications: none    Date of Delivery: 20      Type of Delivery: vaginal, spontaneous    Delivered By: Kendra Campoverde     Baby:      Information for the patient's :  Case Garza [[id number]]   APGAR One: 9    Information for the patient's :  Marlene Garza [[id number]]   APGAR Five: 9    Information for the patient's :  Marlene Garza [[id number]]   Birth Weight: 6 lb 14.4 oz (3.13 kg)      Anesthesia: Local    Intrapartum complications: None    Postpartum complications: none    Discharge Medication:    Bella Ness [address] [place] [address] Medication Instructions TZE:[id number]    Printed on:20 0607   Medication Information                      cephALEXin (KEFLEX) 500 MG capsule  Take 1 capsule by mouth 2 times daily             Prenatal Vit-Fe Fumarate-FA (PRENATAL VITAMIN PLUS LOW IRON) 27-1 MG TABS  Take 1 tablet by mouth daily                  Discharge Condition:  good    Discharge Date: 20    PLAN:  Follow up in 6 weeks for routine PP visit  All questions answered  D/C summary begun at delivery for D/C planning purposes, any delay in discharge from ordered D/C date due to  factors.

## 2020-06-28 NOTE — ANESTHESIA PRE PROCEDURE
Department of Anesthesiology  Preprocedure Note       Name:  Timi Friend   Age:  34 y.o.  :  1990                                          MRN:  [id number]         Date:  2020      Surgeon: * No surgeons listed *    Procedure: * No procedures listed *    Medications prior to admission:   Prior to Admission medications    Medication Sig Start Date End Date Taking?  Authorizing Provider   Prenatal Vit-Fe Fumarate-FA (PRENATAL VITAMIN PLUS LOW IRON) 27-1 MG TABS Take 1 tablet by mouth daily 20  Yes Ronnie TORRES MD   cephALEXin (KEFLEX) 500 MG capsule Take 1 capsule by mouth 2 times daily  Patient not taking: Reported on 2020   Yi Carrion PA-C       Current medications:    Current Facility-Administered Medications   Medication Dose Route Frequency Provider Last Rate Last Dose    lactated ringers infusion   Intravenous Continuous Gracie Armando MD        sodium chloride flush 0.9 % injection 10 mL  10 mL Intravenous 2 times per day Gracie Armando MD        sodium chloride flush 0.9 % injection 10 mL  10 mL Intravenous PRN Gracie Armando MD        acetaminophen (TYLENOL) tablet 650 mg  650 mg Oral Q4H PRN Gracie Armando MD        docusate sodium (COLACE) capsule 100 mg  100 mg Oral BID Emi Moya MD        ondansetron (ZOFRAN) injection 4 mg  4 mg Intravenous Q6H PRN Emi Moya MD        benzocaine-menthol (DERMOPLAST) 20-0.5 % spray   Topical PRN Gracie Armando MD        witch hazel-glycerin (TUCKS) pad   Topical PRN Gracie Armando MD        methylergonovine (METHERGINE) injection 200 mcg  200 mcg Intramuscular PRN Gracie Armando MD        carboprost (HEMABATE) injection 250 mcg  250 mcg Intramuscular PRN Gracie Armando MD           Allergies:  No Known Allergies    Problem List:    Patient Active Problem List   Diagnosis Code    Language barrier Z68.5    Late prenatal care affecting pregnancy in third Applicable): No results found for: PREGTESTUR, PREGSERUM, HCG, HCGQUANT     ABGs: No results found for: PHART, PO2ART, MXK5ILP, FOL9CAD, BEART, Q8MZOUZX     Type & Screen (If Applicable):  No results found for: LABABO, LABRH    Drug/Infectious Status (If Applicable):  No results found for: HIV, HEPCAB    COVID-19 Screening (If Applicable): No results found for: COVID19      Anesthesia Evaluation  Patient summary reviewed and Nursing notes reviewed  Airway: Mallampati: II  TM distance: >3 FB   Neck ROM: full  Mouth opening: > = 3 FB Dental: normal exam         Pulmonary:Negative Pulmonary ROS and normal exam                               Cardiovascular:Negative CV ROS                      Neuro/Psych:   Negative Neuro/Psych ROS              GI/Hepatic/Renal: Neg GI/Hepatic/Renal ROS            Endo/Other: Negative Endo/Other ROS                    Abdominal:           Vascular: negative vascular ROS. Anesthesia Plan      epidural     ASA 2     (Lab Results       Component                Value               Date                       WBC                      8.7                 06/28/2020                 HGB                      12.0                06/28/2020                 HCT                      35.7 (L)            06/28/2020                 MCV                      87.0                06/28/2020                 PLT                      281                 06/28/2020            Height: 5' (1.524 m), Weight: 129 lb (58.5 kg)  Patient debating natural vs epidural for labor and delivery. Discussed procedure and risks including but not limited to changes in VSS, allergic reaction, infection, intravascular injection, paresthesia, n/v, severe headache, temporary or permanent rare neurologic sequelae and failed block. All questions answered. Patient agreed to proceed )        Anesthetic plan and risks discussed with patient.     Use of blood products discussed with patient whom

## 2020-06-28 NOTE — FLOWSHEET NOTE
Covid testing - result not found per charge RN. Need to verify testing was completed in clinic, if not covid test to be completed in recovery per clinic policy. Verbal order to wait unitl after delivery for covid testing to be completed - patient NCB on hands and knees rocking and moaning. Deferred for patient comfort.      Shante Sands

## 2020-06-28 NOTE — PROGRESS NOTES
Contraction pain is moderate.  Epidural No    VS:   Vitals:    20 1411 20 1457 20 1524 20 1549   BP: (!) 121/57 110/62 (!) 75/50 (!) 141/64   Pulse: 69 63  97   Resp:  18 18 20   Temp:       TempSrc:       Weight:       Height:             FHT: Baseline: 150   Variability: moderate   Accelerations: Present   Decels: Variable: mild    Cervical Exam:   Dilation (cm): 7(after AROM )   Effacement (%): 90   Cervical Characteristics: Anterior   Station: 0   Presentation: Vertex  AROM done- clear fluid    A/P: 34 y.o.  38w2d   · Labor: progressing well     · Fetus: reassuring  · GBS: neg  · Other: desires NCB

## 2020-06-28 NOTE — H&P
Department of Obstetrics and Gynecology   Obstetrics History and Physical        CHIEF COMPLAINT:  contractions    HISTORY OF PRESENT ILLNESS:      The patient is a 34 y.o. female at 36w4d. OB History        3    Para   1    Term   1       0    AB   1    Living   1       SAB   0    TAB   0    Ectopic   0    Molar   0    Multiple   0    Live Births   1            Patient presents with a chief complaint as above and is being admitted for active phase labor    Estimated Due Date: Estimated Date of Delivery: 7/10/20    PRENATAL CARE:    Complicated by: none    PAST OB HISTORY:  OB History        3    Para   1    Term   1       0    AB   1    Living   1       SAB   0    TAB   0    Ectopic   0    Molar   0    Multiple   0    Live Births   1                Past Medical History:        Diagnosis Date     (normal spontaneous vaginal delivery)      Past Surgical History:    No past surgical history on file. Allergies:  Patient has no known allergies.     Social History:    Social History     Socioeconomic History    Marital status: Single     Spouse name: Not on file    Number of children: Not on file    Years of education: Not on file    Highest education level: Not on file   Occupational History    Not on file   Social Needs    Financial resource strain: Not on file    Food insecurity     Worry: Not on file     Inability: Not on file    Transportation needs     Medical: Not on file     Non-medical: Not on file   Tobacco Use    Smoking status: Never Smoker    Smokeless tobacco: Never Used   Substance and Sexual Activity    Alcohol use: Not Currently    Drug use: Never    Sexual activity: Yes     Partners: Male   Lifestyle    Physical activity     Days per week: Not on file     Minutes per session: Not on file    Stress: Not on file   Relationships    Social connections     Talks on phone: Not on file     Gets together: Not on file     Attends Protestant service: Not on file     Active member of club or organization: Not on file     Attends meetings of clubs or organizations: Not on file     Relationship status: Not on file    Intimate partner violence     Fear of current or ex partner: Not on file     Emotionally abused: Not on file     Physically abused: Not on file     Forced sexual activity: Not on file   Other Topics Concern    Not on file   Social History Narrative    Not on file     Family History:   No family history on file. Medications Prior to Admission:  Medications Prior to Admission: Prenatal Vit-Fe Fumarate-FA (PRENATAL VITAMIN PLUS LOW IRON) 27-1 MG TABS, Take 1 tablet by mouth daily  cephALEXin (KEFLEX) 500 MG capsule, Take 1 capsule by mouth 2 times daily (Patient not taking: Reported on 5/12/2020)  [DISCONTINUED] Prenatal Vit-Fe Fumarate-FA (PRENATAL VITAMIN) 27-0.8 MG TABS, Take 1 tablet by mouth daily    REVIEW OF SYSTEMS:        PHYSICAL EXAM:  Vitals:    06/28/20 0737 06/28/20 1142   BP:  117/74   Pulse:  78   Resp: 18 16   Temp: 97.8 °F (36.6 °C) 98.1 °F (36.7 °C)   TempSrc: Temporal Oral   Weight: 129 lb (58.5 kg) 129 lb (58.5 kg)   Height: 5' (1.524 m) 5' (1.524 m)     General appearance:  awake, alert, cooperative, no apparent distress, and appears stated age  Neurologic:  Awake, alert, oriented to name, place and time. Lungs:  No increased work of breathing, good air exchange  Abdomen:  Soft, non tender, gravid, consistent with her gestational age, EFW by Leopold's maneuver was 3200 gms   Fetal heart rate:  Reassuring.   Pelvis:  Adequate pelvis  Cervix: 3/80  Contraction frequency:      Membranes:  Intact    Labs:   CBC:   Lab Results   Component Value Date    WBC 8.7 06/28/2020    RBC 4.10 06/28/2020    HGB 12.0 06/28/2020    HCT 35.7 06/28/2020    MCV 87.0 06/28/2020    MCH 29.4 06/28/2020    MCHC 33.7 06/28/2020    RDW 15.2 06/28/2020     06/28/2020    MPV 8.2 06/28/2020       ASSESSMENT AND PLAN:    Labor: Admit, anticipate normal

## 2020-06-29 LAB — TOTAL SYPHILLIS IGG/IGM: NORMAL

## 2020-06-29 PROCEDURE — 6370000000 HC RX 637 (ALT 250 FOR IP): Performed by: OBSTETRICS & GYNECOLOGY

## 2020-06-29 PROCEDURE — 2580000003 HC RX 258: Performed by: OBSTETRICS & GYNECOLOGY

## 2020-06-29 PROCEDURE — 1220000000 HC SEMI PRIVATE OB R&B

## 2020-06-29 RX ADMIN — ACETAMINOPHEN 650 MG: 325 TABLET, FILM COATED ORAL at 00:48

## 2020-06-29 RX ADMIN — DOCUSATE SODIUM 100 MG: 100 CAPSULE ORAL at 09:44

## 2020-06-29 RX ADMIN — ACETAMINOPHEN 650 MG: 325 TABLET, FILM COATED ORAL at 16:16

## 2020-06-29 RX ADMIN — ACETAMINOPHEN 650 MG: 325 TABLET, FILM COATED ORAL at 09:44

## 2020-06-29 RX ADMIN — IBUPROFEN 800 MG: 800 TABLET, FILM COATED ORAL at 04:50

## 2020-06-29 RX ADMIN — Medication 10 ML: at 09:44

## 2020-06-29 RX ADMIN — IBUPROFEN 800 MG: 800 TABLET, FILM COATED ORAL at 12:24

## 2020-06-29 RX ADMIN — IBUPROFEN 800 MG: 800 TABLET, FILM COATED ORAL at 20:36

## 2020-06-29 ASSESSMENT — PAIN SCALES - GENERAL
PAINLEVEL_OUTOF10: 3
PAINLEVEL_OUTOF10: 2
PAINLEVEL_OUTOF10: 3
PAINLEVEL_OUTOF10: 3
PAINLEVEL_OUTOF10: 4
PAINLEVEL_OUTOF10: 3

## 2020-06-29 NOTE — FLOWSHEET NOTE
Court in room. Explained pain meds, colace, adamaris care, bleeding precautions, importance of emptying bladder frequently, ambulation, and ordering meals.  Pt and FOB verbalized understanding of all above info

## 2020-06-29 NOTE — FLOWSHEET NOTE
Dr. Lee Robertson notified of pt's B/P 82/53, heart rate 58. No orders noted at present will continue to monitor.

## 2020-06-29 NOTE — PLAN OF CARE
Problem: VAGINAL DELIVERY - RECOVERY AND POST PARTUM  Goal: Vital signs are medically acceptable  Outcome: Ongoing  Goal: Fundus firm at midline  Outcome: Ongoing  Goal: Moderate rubra without clots, no purulent discharge, no foul smelling lochia  Outcome: Ongoing  Goal: Verbalizes understanding of normal bowel function resumption  Outcome: Ongoing  Goal: Breasts are soft with nipple integrity intact  Outcome: Ongoing  Goal: Demonstrates appropriate breast feeding techniques  Outcome: Ongoing  Goal: Appropriate behavior observed  Outcome: Ongoing  Goal: Positive Mother-Baby interactions are observed  Outcome: Ongoing  Goal: Perineum intact without discharge or hematoma  Outcome: Ongoing     Problem: PAIN  Goal: Patient's pain/discomfort is manageable  Outcome: Ongoing     Problem: KNOWLEDGE DEFICIT  Goal: Patient/S.O. demonstrates understanding of disease process, treatment plan, medications, and discharge instructions.   Outcome: Ongoing

## 2020-06-29 NOTE — PROGRESS NOTES
[facility] post-partum note    Post-partum Day #1    Subjective:    Doing well  Lochia: Normal    Objective:  Vitals:    06/28/20 2000 06/29/20 0000 06/29/20 0440 06/29/20 0944   BP: (!) 87/53 (!) 83/44 (!) 82/53 (!) 90/56   Pulse: 59 58 58    Resp: 18 18 18    Temp: 100.5 °F (38.1 °C) 98.5 °F (36.9 °C) 98.6 °F (37 °C)    TempSrc: Oral Oral Oral    SpO2:   98%    Weight:       Height:           Physical Examination:  Appears well  Uterus: Firm, NT  Calves: NT    Labs:    Recent Labs     06/28/20  1110   WBC 8.7   HGB 12.0   HCT 35.7*        No results for input(s): NA, K, CL, CO2, BUN, CREATININE, CALCIUM, AST, ALT in the last 72 hours.     Invalid input(s): DOUGLAS      Current Facility-Administered Medications:     sodium chloride flush 0.9 % injection 10 mL, 10 mL, Intravenous, 2 times per day, Daniel Martinez MD, 10 mL at 06/29/20 0944    sodium chloride flush 0.9 % injection 10 mL, 10 mL, Intravenous, PRN, Daniel Martinez MD    acetaminophen (TYLENOL) tablet 650 mg, 650 mg, Oral, Q4H PRN, Daniel Martinez MD, 650 mg at 06/29/20 0944    ibuprofen (ADVIL;MOTRIN) tablet 800 mg, 800 mg, Oral, Q8H, Emi Moya MD, 800 mg at 06/29/20 1224    docusate sodium (COLACE) capsule 100 mg, 100 mg, Oral, BID, Chela Session MD Nita, 100 mg at 06/29/20 0944    Tetanus-Diphth-Acell Pertussis (BOOSTRIX) injection 0.5 mL, 0.5 mL, Intramuscular, Prior to discharge, Daniel Martinez MD    lansinoh lanolin ointment, , Topical, PRN, Chela Session MD Nita    benzocaine-menthol (DERMOPLAST) 20-0.5 % spray, , Topical, PRN, Daniel Martinez MD    HYDROcodone-acetaminophen (NORCO) 5-325 MG per tablet 1 tablet, 1 tablet, Oral, Q4H PRN **OR** HYDROcodone-acetaminophen (NORCO) 5-325 MG per tablet 2 tablet, 2 tablet, Oral, Q4H PRN, Daniel Martinez MD     Assessment/Plan:      Post Partum: advance Postpartum care

## 2020-06-29 NOTE — FLOWSHEET NOTE
Discharge Rx for motrin and oxycodone given to pt with instructions on use and potential side effects, using .  Pt verbalized understanding

## 2020-06-30 VITALS
BODY MASS INDEX: 25.32 KG/M2 | DIASTOLIC BLOOD PRESSURE: 49 MMHG | HEART RATE: 78 BPM | HEIGHT: 60 IN | RESPIRATION RATE: 18 BRPM | SYSTOLIC BLOOD PRESSURE: 90 MMHG | WEIGHT: 129 LBS | TEMPERATURE: 96.7 F | OXYGEN SATURATION: 98 %

## 2020-06-30 PROCEDURE — 6370000000 HC RX 637 (ALT 250 FOR IP): Performed by: OBSTETRICS & GYNECOLOGY

## 2020-06-30 RX ADMIN — IBUPROFEN 800 MG: 800 TABLET, FILM COATED ORAL at 04:45

## 2020-06-30 RX ADMIN — DOCUSATE SODIUM 100 MG: 100 CAPSULE ORAL at 09:58

## 2020-06-30 RX ADMIN — ACETAMINOPHEN 650 MG: 325 TABLET, FILM COATED ORAL at 09:58

## 2020-06-30 ASSESSMENT — PAIN SCALES - GENERAL
PAINLEVEL_OUTOF10: 4
PAINLEVEL_OUTOF10: 3

## 2020-06-30 NOTE — PROGRESS NOTES
[facility] post-partum note    Post-partum Day #2    Subjective:   Pain is : Mild  Lochia: thin lochia  Breastfeeding: plans to bottle feed    Objective:  Patient Vitals for the past 8 hrs:   BP Temp Temp src Pulse Resp   06/30/20 1003 (!) 90/49 96.7 °F (35.9 °C) Oral 78 18   06/30/20 0430 (!) 87/56 98 °F (36.7 °C) Oral 70 16     Abd: soft, non tender  Uterus: firm  Ext: no edema, calves non tender    Lab Results   Component Value Date    WBC 8.7 06/28/2020    HGB 12.0 06/28/2020    HCT 35.7 (L) 06/28/2020    MCV 87.0 06/28/2020     06/28/2020            Assessment/Plan:      Continue Postpartum care  Discharge today: yes  Follow up: 6 weeks

## 2020-06-30 NOTE — FLOWSHEET NOTE
Postpartum and infant care teaching completed and forms signed by patient. Copy witnessed by RN and given to patient. Patient verbalized understanding of all teaching points. Patient has prescriptions. Patient plans to follow-up with OB Provider in 6 weeks as instructed. Patient verbalizes understanding of discharge instructions and denies further questions. ID bands checked. Mother's ID band and one of baby's ID bands removed and taped to footprint sheet, signed by patient and witnessed by RN. Patient discharged in stable condition accompanied by family. Discharged in wheelchair, holding baby in arms.

## 2020-07-07 ENCOUNTER — FOLLOWUP TELEPHONE ENCOUNTER (OUTPATIENT)
Dept: OBGYN | Age: 30
End: 2020-07-07

## 2020-08-11 ENCOUNTER — POSTPARTUM VISIT (OUTPATIENT)
Dept: OBGYN | Age: 30
End: 2020-08-11

## 2020-08-11 VITALS
WEIGHT: 118.4 LBS | SYSTOLIC BLOOD PRESSURE: 103 MMHG | HEART RATE: 74 BPM | DIASTOLIC BLOOD PRESSURE: 63 MMHG | BODY MASS INDEX: 23.12 KG/M2

## 2020-08-11 PROCEDURE — 99214 OFFICE O/P EST MOD 30 MIN: CPT | Performed by: OBSTETRICS & GYNECOLOGY

## 2020-08-11 RX ORDER — PNV NO.95/FERROUS FUM/FOLIC AC 28MG-0.8MG
1 TABLET ORAL DAILY
Qty: 30 TABLET | Refills: 8 | Status: SHIPPED | OUTPATIENT
Start: 2020-08-11

## 2020-08-11 RX ORDER — ACETAMINOPHEN AND CODEINE PHOSPHATE 120; 12 MG/5ML; MG/5ML
1 SOLUTION ORAL DAILY
Qty: 28 TABLET | Refills: 11 | Status: SHIPPED | OUTPATIENT
Start: 2020-08-11

## 2020-08-11 NOTE — PROGRESS NOTES
Social Service Note:   Patient completed depression scale. Patient scored a 8 and is not showing signs of depression. Patient didn't have any other questions or needs at this time.     Carri NEVAREZW, [place]

## 2020-08-25 ENCOUNTER — POSTPARTUM VISIT (OUTPATIENT)
Dept: OBGYN | Age: 30
End: 2020-08-25

## 2020-08-25 VITALS
WEIGHT: 118 LBS | SYSTOLIC BLOOD PRESSURE: 103 MMHG | BODY MASS INDEX: 23.05 KG/M2 | DIASTOLIC BLOOD PRESSURE: 64 MMHG | HEART RATE: 70 BPM

## 2020-08-25 PROCEDURE — 99213 OFFICE O/P EST LOW 20 MIN: CPT | Performed by: OBSTETRICS & GYNECOLOGY

## 2020-08-25 NOTE — PROGRESS NOTES
Follow up from postpartum visit 8/11/20 & PPD sx given rx for Zoloft, patient states \"feeling better with medication\". Menses 2 weeks ago taking BCP. S: Pt states feeling better since started Zoloft.  No SI/HI  O: Blood pressure 103/64, pulse 70, weight 118 lb (53.5 kg), unknown if currently breastfeeding.   s       Mood - well  A/P: PPM Depression      Continue zoloft

## 2022-12-12 ENCOUNTER — APPOINTMENT (OUTPATIENT)
Dept: ULTRASOUND IMAGING | Age: 32
End: 2022-12-12

## 2022-12-12 ENCOUNTER — HOSPITAL ENCOUNTER (EMERGENCY)
Age: 32
Discharge: HOME | End: 2022-12-12

## 2022-12-12 VITALS
BODY MASS INDEX: 25.97 KG/M2 | DIASTOLIC BLOOD PRESSURE: 64 MMHG | OXYGEN SATURATION: 100 % | SYSTOLIC BLOOD PRESSURE: 114 MMHG | RESPIRATION RATE: 18 BRPM | TEMPERATURE: 98.1 F | WEIGHT: 133 LBS | HEART RATE: 77 BPM

## 2022-12-12 DIAGNOSIS — O20.0 THREATENED MISCARRIAGE: Primary | ICD-10-CM

## 2022-12-12 LAB
A/G RATIO: 1.4 (ref 1.1–2.2)
ABO/RH: NORMAL
ALBUMIN SERPL-MCNC: 3.9 G/DL (ref 3.4–5)
ALP BLD-CCNC: 82 U/L (ref 40–129)
ALT SERPL-CCNC: 13 U/L (ref 10–40)
ANION GAP SERPL CALCULATED.3IONS-SCNC: 7 MMOL/L (ref 3–16)
AST SERPL-CCNC: 12 U/L (ref 15–37)
BACTERIA WET PREP: NORMAL
BACTERIA: ABNORMAL /HPF
BASOPHILS ABSOLUTE: 0 K/UL (ref 0–0.2)
BASOPHILS RELATIVE PERCENT: 0.8 %
BILIRUB SERPL-MCNC: 0.4 MG/DL (ref 0–1)
BILIRUBIN URINE: NEGATIVE
BLOOD, URINE: ABNORMAL
BUN BLDV-MCNC: 9 MG/DL (ref 7–20)
CALCIUM SERPL-MCNC: 9.5 MG/DL (ref 8.3–10.6)
CHLORIDE BLD-SCNC: 105 MMOL/L (ref 99–110)
CLARITY: ABNORMAL
CLUE CELLS: NORMAL
CO2: 27 MMOL/L (ref 21–32)
COLOR: YELLOW
CREAT SERPL-MCNC: <0.5 MG/DL (ref 0.6–1.1)
EOSINOPHILS ABSOLUTE: 0.2 K/UL (ref 0–0.6)
EOSINOPHILS RELATIVE PERCENT: 4.1 %
EPITHELIAL CELLS WET PREP: NORMAL
EPITHELIAL CELLS, UA: 11 /HPF (ref 0–5)
GFR SERPL CREATININE-BSD FRML MDRD: >60 ML/MIN/{1.73_M2}
GLUCOSE BLD-MCNC: 121 MG/DL (ref 70–99)
GLUCOSE URINE: NEGATIVE MG/DL
GONADOTROPIN, CHORIONIC (HCG) QUANT: 91.5 MIU/ML
HCG QUALITATIVE: POSITIVE
HCT VFR BLD CALC: 37.4 % (ref 36–48)
HEMOGLOBIN: 12.1 G/DL (ref 12–16)
HYALINE CASTS: 0 /LPF (ref 0–8)
KETONES, URINE: NEGATIVE MG/DL
LEUKOCYTE ESTERASE, URINE: ABNORMAL
LIPASE: 15 U/L (ref 13–60)
LYMPHOCYTES ABSOLUTE: 1.2 K/UL (ref 1–5.1)
LYMPHOCYTES RELATIVE PERCENT: 25.2 %
MCH RBC QN AUTO: 29.1 PG (ref 26–34)
MCHC RBC AUTO-ENTMCNC: 32.3 G/DL (ref 31–36)
MCV RBC AUTO: 90.1 FL (ref 80–100)
MICROSCOPIC EXAMINATION: YES
MONOCYTES ABSOLUTE: 0.4 K/UL (ref 0–1.3)
MONOCYTES RELATIVE PERCENT: 8.8 %
NEUTROPHILS ABSOLUTE: 3 K/UL (ref 1.7–7.7)
NEUTROPHILS RELATIVE PERCENT: 61.1 %
NITRITE, URINE: NEGATIVE
PDW BLD-RTO: 13.6 % (ref 12.4–15.4)
PH UA: 7.5 (ref 5–8)
PLATELET # BLD: 295 K/UL (ref 135–450)
PMV BLD AUTO: 7.1 FL (ref 5–10.5)
POTASSIUM SERPL-SCNC: 4.2 MMOL/L (ref 3.5–5.1)
PROTEIN UA: NEGATIVE MG/DL
RBC # BLD: 4.14 M/UL (ref 4–5.2)
RBC UA: 6 /HPF (ref 0–4)
RBC WET PREP: NORMAL
SODIUM BLD-SCNC: 139 MMOL/L (ref 136–145)
SOURCE WET PREP: NORMAL
SPECIFIC GRAVITY UA: 1.02 (ref 1–1.03)
TOTAL PROTEIN: 6.7 G/DL (ref 6.4–8.2)
TRICHOMONAS PREP: NORMAL
URINE REFLEX TO CULTURE: ABNORMAL
URINE TYPE: ABNORMAL
UROBILINOGEN, URINE: 1 E.U./DL
WBC # BLD: 4.8 K/UL (ref 4–11)
WBC UA: 2 /HPF (ref 0–5)
WBC WET PREP: NORMAL
YEAST WET PREP: NORMAL

## 2022-12-12 PROCEDURE — 6370000000 HC RX 637 (ALT 250 FOR IP): Performed by: PHYSICIAN ASSISTANT

## 2022-12-12 PROCEDURE — 87591 N.GONORRHOEAE DNA AMP PROB: CPT

## 2022-12-12 PROCEDURE — 99283 EMERGENCY DEPT VISIT LOW MDM: CPT

## 2022-12-12 PROCEDURE — 76817 TRANSVAGINAL US OBSTETRIC: CPT

## 2022-12-12 PROCEDURE — 84703 CHORIONIC GONADOTROPIN ASSAY: CPT

## 2022-12-12 PROCEDURE — 86900 BLOOD TYPING SEROLOGIC ABO: CPT

## 2022-12-12 PROCEDURE — 85025 COMPLETE CBC W/AUTO DIFF WBC: CPT

## 2022-12-12 PROCEDURE — 86901 BLOOD TYPING SEROLOGIC RH(D): CPT

## 2022-12-12 PROCEDURE — 81001 URINALYSIS AUTO W/SCOPE: CPT

## 2022-12-12 PROCEDURE — 87210 SMEAR WET MOUNT SALINE/INK: CPT

## 2022-12-12 PROCEDURE — 80053 COMPREHEN METABOLIC PANEL: CPT

## 2022-12-12 PROCEDURE — 83690 ASSAY OF LIPASE: CPT

## 2022-12-12 PROCEDURE — 87491 CHLMYD TRACH DNA AMP PROBE: CPT

## 2022-12-12 PROCEDURE — 84702 CHORIONIC GONADOTROPIN TEST: CPT

## 2022-12-12 RX ORDER — ACETAMINOPHEN 500 MG
1000 TABLET ORAL ONCE
Status: COMPLETED | OUTPATIENT
Start: 2022-12-12 | End: 2022-12-12

## 2022-12-12 RX ADMIN — ACETAMINOPHEN 1000 MG: 500 TABLET ORAL at 12:27

## 2022-12-12 ASSESSMENT — ENCOUNTER SYMPTOMS
NAUSEA: 0
SHORTNESS OF BREATH: 0
VOMITING: 0
ABDOMINAL PAIN: 1
DIARRHEA: 0
CHEST TIGHTNESS: 0

## 2022-12-12 ASSESSMENT — PAIN - FUNCTIONAL ASSESSMENT: PAIN_FUNCTIONAL_ASSESSMENT: NONE - DENIES PAIN

## 2022-12-12 NOTE — ED PROVIDER NOTES
[facility]        Pt Name: Ean Collier  MRN: [id number]  [place] 1990  Date of evaluation: 12/12/2022  Provider: Jeanene Rinne, PA-C  PCP: No primary care provider on file. Note Started: 11:43 AM EST 12/12/22          JONH. I have evaluated this patient. My supervising physician was available for consultation. CHIEF COMPLAINT       Chief Complaint   Patient presents with    Vaginal Bleeding     Pt states she had her period on 11/24 and it was very painful and heavy bleeding with clots, she states she has continued to bleed and is having lower abdominal pain and lower back pain. ALso has pain when she urinates and has noticed blood in urine as well. States using about half a pad a day. Denies nvd. HISTORY OF PRESENT ILLNESS   (Location, Timing/Onset, Context/Setting, Quality, Duration, Modifying Factors, Severity, Associated Signs and Symptoms)  Note limiting factors. Chief Complaint: Heavy menstrual bleeding, abdominal pain and dysuria    Raissa Salazar is a 28 y.o. female who presents to the emergency department today stating she started her menstrual period on 11/24/2022 and began having abdominal pain. She states she has had a very heavy period however the last 2 days the bleeding has nearly stopped. She states she is still having abdominal pain she describes as an achy, constant, 5/10 pain that localizes to her lower abdomen and radiates to her back. She denies nausea, vomiting, diarrhea or constipation. She has had no fevers or chills. She states she has also had dysuria and hematuria. She is G3, P2. She is Bulgarian-speaking and  #[id number] was used to communicate with the patient. Nursing Notes were all reviewed and agreed with or any disagreements were addressed in the HPI.     REVIEW OF SYSTEMS    (2-9 systems for level 4, 10 or more for level 5)     Review of Systems Constitutional:  Negative for chills and fever. Respiratory:  Negative for chest tightness and shortness of breath. Cardiovascular:  Negative for chest pain and palpitations. Gastrointestinal:  Positive for abdominal pain. Negative for diarrhea, nausea and vomiting. Genitourinary:  Positive for dysuria, hematuria and vaginal bleeding. Negative for difficulty urinating, pelvic pain, vaginal discharge and vaginal pain. Neurological:  Negative for dizziness, weakness, light-headedness and headaches. All other systems reviewed and are negative. Positives and Pertinent negatives as per HPI. Except as noted above in the ROS, all other systems were reviewed and negative. PAST MEDICAL HISTORY     Past Medical History:   Diagnosis Date     (normal spontaneous vaginal delivery)          SURGICAL HISTORY   History reviewed. No pertinent surgical history. [address]       Discharge Medication List as of 2022  5:38 PM        CONTINUE these medications which have NOT CHANGED    Details   Prenatal Vit-Fe Fumarate-FA (PRENATAL VITAMIN) 27-0.8 MG TABS Take 1 tablet by mouth daily, Disp-30 tablet,R-8Print      sertraline (ZOLOFT) 50 MG tablet Take 1 tablet by mouth daily, Disp-30 tablet,R-5Print      norethindrone (ORTHO MICRONOR) 0.35 MG tablet Take 1 tablet by mouth daily, Disp-28 tablet,R-11Print      ibuprofen (ADVIL;MOTRIN) 600 MG tablet Take 1 tablet by mouth every 6 hours as needed for Pain, Disp-30 tablet, R-1Print               ALLERGIES     Patient has no known allergies. FAMILYHISTORY     History reviewed. No pertinent family history.        SOCIAL HISTORY       Social History     Tobacco Use    Smoking status: Never    Smokeless tobacco: Never   Vaping Use    Vaping Use: Never used   Substance Use Topics    Alcohol use: Not Currently    Drug use: Never       SCREENINGS             PHYSICAL EXAM    (up to 7 for level 4, 8 or more for level 5)     ED Triage Vitals [12/12/22 1126]   BP Temp Temp Source Heart Rate Resp SpO2 Height Weight   114/64 98.1 °F (36.7 °C) Oral 77 18 100 % -- 133 lb (60.3 kg)       Physical Exam  Vitals and nursing note reviewed. Constitutional:       Appearance: She is well-developed. She is not diaphoretic. HENT:      Head: Normocephalic and atraumatic. Eyes:      General:         Right eye: No discharge. Left eye: No discharge. Cardiovascular:      Rate and Rhythm: Normal rate and regular rhythm. Pulses: Normal pulses. Heart sounds: Normal heart sounds. Pulmonary:      Effort: Pulmonary effort is normal. No respiratory distress. Breath sounds: Normal breath sounds. Abdominal:      General: Abdomen is flat. Bowel sounds are normal.      Palpations: Abdomen is soft. Tenderness: There is abdominal tenderness in the suprapubic area. Comments: Patient has tenderness on palpation of the suprapubic region of the abdomen. There is no distention, rigidity or guarding. There is no Pathak sign, McBurney's point or CVA tenderness on palpation. Abdomen is soft with bowel sounds present in all 4 quadrants. Genitourinary:     Comments: External genitalia reveals no swelling, erythema, or external lesions. Speculum was inserted without difficulty. There was a scant amount blood noted in the vaginal vault without blood. Cervical os is dilated to about half the size of the tip of my little finger. Bimanual exam reveals no cervical motion tenderness, uterine tenderness or adnexal tenderness. There are no masses palpated. Pelvic exam was performed with female chaperone present. Musculoskeletal:         General: Normal range of motion. Cervical back: Normal range of motion and neck supple. Skin:     General: Skin is warm and dry. Coloration: Skin is not pale. Neurological:      Mental Status: She is alert and oriented to person, place, and time.    Psychiatric:         Behavior: Behavior normal. DIAGNOSTIC RESULTS   LABS:    Labs Reviewed   COMPREHENSIVE METABOLIC PANEL - Abnormal; Notable for the following components:       Result Value    Glucose 121 (*)     Creatinine <0.5 (*)     AST 12 (*)     All other components within normal limits   URINALYSIS WITH REFLEX TO CULTURE - Abnormal; Notable for the following components:    Clarity, UA CLOUDY (*)     Blood, Urine MODERATE (*)     Leukocyte Esterase, Urine TRACE (*)     All other components within normal limits   MICROSCOPIC URINALYSIS - Abnormal; Notable for the following components:    Bacteria, UA 1+ (*)     RBC, UA 6 (*)     Epithelial Cells, UA 11 (*)     All other components within normal limits   WET PREP, GENITAL   C.TRACHOMATIS N.GONORRHOEAE DNA   CBC WITH AUTO DIFFERENTIAL   LIPASE   HCG, SERUM, QUALITATIVE   HCG, QUANTITATIVE, PREGNANCY   ABO/RH       When ordered only abnormal lab results are displayed. All other labs were within normal range or not returned as of this dictation. EKG: When ordered, EKG's are interpreted by the Emergency Department Physician in the absence of a cardiologist.  Please see their note for interpretation of EKG. RADIOLOGY:   Non-plain film images such as CT, Ultrasound and MRI are read by the radiologist. Plain radiographic images are visualized and preliminarily interpreted by the ED Provider with the below findings:        Interpretation per the Radiologist below, if available at the time of this note:    US OB TRANSVAGINAL   Final Result      1. No evidence of IUP and therefore ectopic pregnancy cannot be excluded if   suspected clinically. 2.  Endometrium is thickened at 16 mm and heterogeneous in appearance. 3.  3 cm simple appearing right ovarian cyst and additional much smaller   ovarian follicles.                  PROCEDURES   Unless otherwise noted below, none     Procedures    CRITICAL CARE TIME       CONSULTS:  None      EMERGENCY DEPARTMENT COURSE and DIFFERENTIAL DIAGNOSIS/MDM: Vitals:    Vitals:    12/12/22 1126   BP: 114/64   Pulse: 77   Resp: 18   Temp: 98.1 °F (36.7 °C)   TempSrc: Oral   SpO2: 100%   Weight: 133 lb (60.3 kg)       Patient was given the following medications:  Medications   acetaminophen (TYLENOL) tablet 1,000 mg (1,000 mg Oral Given 12/12/22 1227)         Is this patient to be included in the SEP-1 Core Measure due to severe sepsis or septic shock? No   Exclusion criteria - the patient is NOT to be included for SEP-1 Core Measure due to: Infection is not suspected    Patient presented to the emergency department today complaining of heavy menstrual bleeding since 11/24/2022. She also complains of suprapubic abdominal pain as well as dysuria and hematuria. She is Slovenian-speaking and  #[id number] was used to communicate with the patient. She denies having any nausea, vomiting or diarrhea. She has a benign abdominal exam. CBC, BMP, LFTs and lipase are unremarkable. Her qualitative hCG is positive. Her quantitative hCG is 91.5. She is O+. Urine analysis appears contaminated and will be sent for culture. Pelvic exam was completed which reveals a scant amount of blood in the vaginal vault. Cervical os is dilated to about half the size of the tip of my little finger. Gonorrhea and chlamydial cultures are pending however I have low suspicion for this. Trichomonas, yeast and clue cells are negative. Transvaginal ultrasound shows no evidence of IUP. Patient is given an order for a repeat quantitative hCG in 48 hours. She will be given a referral to OB/GYN. Patient is hemodynamically stable. She is nonfebrile and does not appear septic or ill. The patient has normal vital signs, reassuring diagnostic tests, minimal bleeding and minimal discomfort. She will need to follow up with her obstetrician.  I advised pelvic rest.  She was given appropriate discharge instructions and advised to return to the emergency department for worsening of her symptoms, onset of fever, or any other concerns. FINAL IMPRESSION      1.  Threatened miscarriage          DISPOSITION/PLAN   DISPOSITION Decision To Discharge 12/12/2022 05:26:04 PM      PATIENT REFERRED TO:  Charu Lagunas MD  [address] [facility], [address] [place] [address]    Schedule an appointment as soon as possible for a visit       DISCHARGE MEDICATIONS:  Discharge Medication List as of 12/12/2022  5:38 PM          DISCONTINUED MEDICATIONS:  Discharge Medication List as of 12/12/2022  5:38 PM                 (Please note that portions of this note were completed with a voice recognition program.  Efforts were made to edit the dictations but occasionally words are mis-transcribed.)    Carlos Mustafa PA-C (electronically signed)           Carlos Mustafa PA-C  12/12/22 [id number]

## 2022-12-13 LAB
C TRACH DNA GENITAL QL NAA+PROBE: NEGATIVE
N. GONORRHOEAE DNA: NEGATIVE

## 2022-12-15 ENCOUNTER — HOSPITAL ENCOUNTER (OUTPATIENT)
Age: 32
Discharge: HOME | End: 2022-12-15

## 2022-12-15 ENCOUNTER — ROUTINE PRENATAL (OUTPATIENT)
Dept: OBGYN | Age: 32
End: 2022-12-15

## 2022-12-15 DIAGNOSIS — N93.9 VAGINAL BLEEDING: Primary | ICD-10-CM

## 2022-12-15 LAB — GONADOTROPIN, CHORIONIC (HCG) QUANT: 61.2 MIU/ML

## 2022-12-15 PROCEDURE — 36415 COLL VENOUS BLD VENIPUNCTURE: CPT

## 2022-12-15 PROCEDURE — 84702 CHORIONIC GONADOTROPIN TEST: CPT

## 2022-12-22 ENCOUNTER — HOSPITAL ENCOUNTER (OUTPATIENT)
Age: 32
Discharge: HOME | End: 2022-12-22

## 2022-12-22 DIAGNOSIS — N93.9 VAGINAL BLEEDING: ICD-10-CM

## 2022-12-22 LAB — GONADOTROPIN, CHORIONIC (HCG) QUANT: 20.5 MIU/ML

## 2022-12-22 PROCEDURE — 84702 CHORIONIC GONADOTROPIN TEST: CPT

## 2022-12-22 PROCEDURE — 36415 COLL VENOUS BLD VENIPUNCTURE: CPT

## 2024-10-08 NOTE — PROGRESS NOTES
ACOG visit, teaching done, vitals reviewed, schedule usd for anatomy, s<d Patient/Caregiver provided printed discharge information.